# Patient Record
Sex: FEMALE | Race: WHITE | URBAN - METROPOLITAN AREA
[De-identification: names, ages, dates, MRNs, and addresses within clinical notes are randomized per-mention and may not be internally consistent; named-entity substitution may affect disease eponyms.]

---

## 2018-01-30 ENCOUNTER — OFFICE (OUTPATIENT)
Dept: URBAN - METROPOLITAN AREA CLINIC 45 | Facility: CLINIC | Age: 68
End: 2018-01-30

## 2018-01-30 VITALS
WEIGHT: 120 LBS | HEIGHT: 66 IN | HEART RATE: 89 BPM | DIASTOLIC BLOOD PRESSURE: 64 MMHG | SYSTOLIC BLOOD PRESSURE: 107 MMHG

## 2018-01-30 DIAGNOSIS — R13.10 DYSPHAGIA: ICD-10-CM

## 2018-01-30 PROCEDURE — 99203 OFFICE O/P NEW LOW 30 MIN: CPT | Performed by: INTERNAL MEDICINE

## 2018-01-30 NOTE — SERVICEHPINOTES
This is my first time assessment of this 67-year-old woman originally from the [facility].  She comes in for evaluation of dysphagia for solid foods with eating.  This has led to regurgitation and vomiting on occasion.  Symptoms have been progressive over the past year.  It is worse with dry foods and meats.  She is able to handle liquids alone without distress.  There is no significant odynophagia or hematemesis but she has lost 5-6 pounds.  She does have diabetes.

## 2018-02-05 ENCOUNTER — AMBULATORY SURGICAL CENTER (OUTPATIENT)
Dept: URBAN - METROPOLITAN AREA SURGERY 37 | Facility: SURGERY | Age: 68
End: 2018-02-05

## 2018-02-05 VITALS
TEMPERATURE: 98 F | HEIGHT: 66 IN | SYSTOLIC BLOOD PRESSURE: 151 MMHG | DIASTOLIC BLOOD PRESSURE: 85 MMHG | WEIGHT: 120 LBS | HEART RATE: 92 BPM

## 2018-02-05 DIAGNOSIS — R13.10 DYSPHAGIA, UNSPECIFIED: ICD-10-CM

## 2018-02-05 DIAGNOSIS — K22.10 ULCER OF ESOPHAGUS WITHOUT BLEEDING: ICD-10-CM

## 2018-02-05 LAB — SURGICAL: PDF REPORT: (no result)

## 2018-02-05 PROCEDURE — 43239 EGD BIOPSY SINGLE/MULTIPLE: CPT | Performed by: INTERNAL MEDICINE

## 2018-02-05 NOTE — SERVICEHPINOTES
This is my first time assessment of this 67-year-old woman originally from the [facility]. She comes in for evaluation of dysphagia for solid foods with eating. This has led to regurgitation and vomiting on occasion. Symptoms have been progressive over the past year. It is worse with dry foods and meats. She is able to handle liquids alone without distress. There is no significant odynophagia or hematemesis but she has lost 5-6 pounds. She does have diabetes.

## 2019-06-09 ENCOUNTER — HOSPITAL ENCOUNTER (EMERGENCY)
Dept: HOSPITAL 12 - ER | Age: 69
Discharge: HOME | End: 2019-06-09
Payer: MEDICARE

## 2019-06-09 VITALS — BODY MASS INDEX: 19.61 KG/M2 | HEIGHT: 66 IN | WEIGHT: 122 LBS

## 2019-06-09 VITALS — SYSTOLIC BLOOD PRESSURE: 109 MMHG | DIASTOLIC BLOOD PRESSURE: 68 MMHG

## 2019-06-09 DIAGNOSIS — I10: ICD-10-CM

## 2019-06-09 DIAGNOSIS — Z79.82: ICD-10-CM

## 2019-06-09 DIAGNOSIS — E03.9: ICD-10-CM

## 2019-06-09 DIAGNOSIS — Z79.899: ICD-10-CM

## 2019-06-09 DIAGNOSIS — Z90.49: ICD-10-CM

## 2019-06-09 DIAGNOSIS — J18.9: Primary | ICD-10-CM

## 2019-06-09 DIAGNOSIS — E11.9: ICD-10-CM

## 2019-06-09 DIAGNOSIS — E78.5: ICD-10-CM

## 2019-06-09 LAB
ALP SERPL-CCNC: 161 U/L (ref 50–136)
ALT SERPL W/O P-5'-P-CCNC: 103 U/L (ref 14–59)
AST SERPL-CCNC: 67 U/L (ref 15–37)
BASOPHILS # BLD AUTO: 0.1 K/UL (ref 0–8)
BASOPHILS NFR BLD AUTO: 1.1 % (ref 0–2)
BILIRUB DIRECT SERPL-MCNC: 0.1 MG/DL (ref 0–0.2)
BILIRUB SERPL-MCNC: 0.3 MG/DL (ref 0.2–1)
BUN SERPL-MCNC: 16 MG/DL (ref 7–18)
CHLORIDE SERPL-SCNC: 102 MMOL/L (ref 98–107)
CO2 SERPL-SCNC: 21 MMOL/L (ref 21–32)
CREAT SERPL-MCNC: 0.8 MG/DL (ref 0.6–1.3)
EOSINOPHIL # BLD AUTO: 0 K/UL (ref 0–0.7)
EOSINOPHIL NFR BLD AUTO: 0.3 % (ref 0–7)
GLUCOSE SERPL-MCNC: 263 MG/DL (ref 74–106)
HCT VFR BLD AUTO: 38.2 % (ref 31.2–41.9)
HGB BLD-MCNC: 12.3 G/DL (ref 10.9–14.3)
LYMPHOCYTES # BLD AUTO: 1.3 K/UL (ref 20–40)
LYMPHOCYTES NFR BLD AUTO: 27.4 % (ref 20.5–51.5)
MCH RBC QN AUTO: 29.4 UUG (ref 24.7–32.8)
MCHC RBC AUTO-ENTMCNC: 32 G/DL (ref 32.3–35.6)
MCV RBC AUTO: 91.4 FL (ref 75.5–95.3)
MONOCYTES # BLD AUTO: 0.6 K/UL (ref 2–10)
MONOCYTES NFR BLD AUTO: 12.6 % (ref 0–11)
NEUTROPHILS # BLD AUTO: 2.7 K/UL (ref 1.8–8.9)
NEUTROPHILS NFR BLD AUTO: 58.6 % (ref 38.5–71.5)
PLATELET # BLD AUTO: 162 K/UL (ref 179–408)
POTASSIUM SERPL-SCNC: 4.1 MMOL/L (ref 3.5–5.1)
RBC # BLD AUTO: 4.17 MIL/UL (ref 3.63–4.92)
WBC # BLD AUTO: 4.6 K/UL (ref 3.8–11.8)
WS STN SPEC: 7.6 G/DL (ref 6.4–8.2)

## 2019-06-09 PROCEDURE — 80076 HEPATIC FUNCTION PANEL: CPT

## 2019-06-09 PROCEDURE — 83880 ASSAY OF NATRIURETIC PEPTIDE: CPT

## 2019-06-09 PROCEDURE — 71045 X-RAY EXAM CHEST 1 VIEW: CPT

## 2019-06-09 PROCEDURE — 84484 ASSAY OF TROPONIN QUANT: CPT

## 2019-06-09 PROCEDURE — 96365 THER/PROPH/DIAG IV INF INIT: CPT

## 2019-06-09 PROCEDURE — A4663 DIALYSIS BLOOD PRESSURE CUFF: HCPCS

## 2019-06-09 PROCEDURE — 94640 AIRWAY INHALATION TREATMENT: CPT

## 2019-06-09 PROCEDURE — 99284 EMERGENCY DEPT VISIT MOD MDM: CPT

## 2019-06-09 PROCEDURE — 80048 BASIC METABOLIC PNL TOTAL CA: CPT

## 2019-06-09 PROCEDURE — 36415 COLL VENOUS BLD VENIPUNCTURE: CPT

## 2019-06-09 PROCEDURE — 93005 ELECTROCARDIOGRAM TRACING: CPT

## 2019-06-09 PROCEDURE — 85025 COMPLETE CBC W/AUTO DIFF WBC: CPT

## 2019-06-09 PROCEDURE — 87040 BLOOD CULTURE FOR BACTERIA: CPT

## 2019-06-09 NOTE — NUR
Patient discharged to home in stable conditon.  Written and verbal after care 
instructions given. 

Patient verbalizes understanding of instructions.PT SAYS FEELS BETTER, 
BREATHING NORMALLY, NO SIGN OF DISTRESS.


-------------------------------------------------------------------------------

Addendum: 06/09/19 at 1722 by [email or web address]

-------------------------------------------------------------------------------

PT ACCOMPANIED BY DAUGHTER IN LAW.

## 2019-06-12 ENCOUNTER — HOSPITAL ENCOUNTER (EMERGENCY)
Dept: HOSPITAL 12 - ER | Age: 69
Discharge: HOME | End: 2019-06-12
Payer: MEDICARE

## 2019-06-12 VITALS — BODY MASS INDEX: 19.61 KG/M2 | WEIGHT: 122 LBS | HEIGHT: 66 IN

## 2019-06-12 VITALS — DIASTOLIC BLOOD PRESSURE: 85 MMHG | SYSTOLIC BLOOD PRESSURE: 128 MMHG

## 2019-06-12 DIAGNOSIS — T36.8X5A: ICD-10-CM

## 2019-06-12 DIAGNOSIS — E78.5: ICD-10-CM

## 2019-06-12 DIAGNOSIS — E11.9: ICD-10-CM
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
## 2019-06-12 DIAGNOSIS — I10: ICD-10-CM

## 2019-06-12 DIAGNOSIS — Z90.49: ICD-10-CM

## 2019-06-12 DIAGNOSIS — R11.2: Primary | ICD-10-CM

## 2019-06-12 DIAGNOSIS — Z79.899: ICD-10-CM

## 2019-06-12 DIAGNOSIS — E03.9: ICD-10-CM

## 2019-06-12 DIAGNOSIS — Z79.82: ICD-10-CM

## 2019-06-12 PROCEDURE — A4663 DIALYSIS BLOOD PRESSURE CUFF: HCPCS
